# Patient Record
Sex: MALE | Employment: FULL TIME | URBAN - METROPOLITAN AREA
[De-identification: names, ages, dates, MRNs, and addresses within clinical notes are randomized per-mention and may not be internally consistent; named-entity substitution may affect disease eponyms.]

---

## 2022-08-05 ENCOUNTER — TELEPHONE (OUTPATIENT)
Dept: GASTROENTEROLOGY | Facility: CLINIC | Age: 52
End: 2022-08-05
Payer: COMMERCIAL

## 2022-08-05 NOTE — TELEPHONE ENCOUNTER
Returned patient call that was stating about a referral sent over in January but there was not anything in the chart found and l/m for him to call back. ARIAN

## 2022-08-05 NOTE — TELEPHONE ENCOUNTER
----- Message from Janey Arzola sent at 7/8/2022  3:15 PM CDT -----  Luan Villa would like for the  to give him a call back as soon as possible to schedule his colonoscopy with Dr Sawyer. He said his pcp sent over his referral back in January and that he never heard from anyone.    Phone: [phone] (home)

## 2022-08-23 ENCOUNTER — TELEPHONE (OUTPATIENT)
Dept: GASTROENTEROLOGY | Facility: CLINIC | Age: 52
End: 2022-08-23
Payer: COMMERCIAL

## 2022-08-23 NOTE — TELEPHONE ENCOUNTER
Returned call to patient wife and let her know that  is not due tell 10/15/2023 and he was last done 10/15/2018. ARIAN

## 2022-08-23 NOTE — TELEPHONE ENCOUNTER
----- Message from Janey Arzola sent at 8/22/2022  3:59 PM CDT -----  Luan Villa is returning a missed call from someone in the clinical team to schedule his colonoscopy. Please give him another call back at [phone] (home)